# Patient Record
Sex: FEMALE | Race: WHITE | NOT HISPANIC OR LATINO | URBAN - METROPOLITAN AREA
[De-identification: names, ages, dates, MRNs, and addresses within clinical notes are randomized per-mention and may not be internally consistent; named-entity substitution may affect disease eponyms.]

---

## 2017-03-24 ENCOUNTER — EMERGENCY (EMERGENCY)
Facility: HOSPITAL | Age: 82
LOS: 1 days | End: 2017-03-24
Attending: EMERGENCY MEDICINE | Admitting: EMERGENCY MEDICINE
Payer: MEDICARE

## 2017-03-24 VITALS
TEMPERATURE: 98 F | HEIGHT: 62 IN | HEART RATE: 70 BPM | RESPIRATION RATE: 16 BRPM | WEIGHT: 164.91 LBS | DIASTOLIC BLOOD PRESSURE: 75 MMHG | SYSTOLIC BLOOD PRESSURE: 119 MMHG | OXYGEN SATURATION: 98 %

## 2017-03-24 VITALS
DIASTOLIC BLOOD PRESSURE: 83 MMHG | OXYGEN SATURATION: 98 % | HEART RATE: 62 BPM | TEMPERATURE: 98 F | RESPIRATION RATE: 16 BRPM | SYSTOLIC BLOOD PRESSURE: 125 MMHG

## 2017-03-24 DIAGNOSIS — Z90.49 ACQUIRED ABSENCE OF OTHER SPECIFIED PARTS OF DIGESTIVE TRACT: Chronic | ICD-10-CM

## 2017-03-24 PROCEDURE — 73562 X-RAY EXAM OF KNEE 3: CPT

## 2017-03-24 PROCEDURE — 99283 EMERGENCY DEPT VISIT LOW MDM: CPT

## 2017-03-24 PROCEDURE — 73562 X-RAY EXAM OF KNEE 3: CPT | Mod: 26,RT

## 2017-03-24 PROCEDURE — 99283 EMERGENCY DEPT VISIT LOW MDM: CPT | Mod: 25

## 2017-03-24 RX ORDER — TRAMADOL HYDROCHLORIDE 50 MG/1
50 TABLET ORAL ONCE
Qty: 0 | Refills: 0 | Status: DISCONTINUED | OUTPATIENT
Start: 2017-03-24 | End: 2017-03-24

## 2017-03-24 RX ADMIN — TRAMADOL HYDROCHLORIDE 50 MILLIGRAM(S): 50 TABLET ORAL at 13:02

## 2017-03-24 NOTE — ED PROVIDER NOTE - OBJECTIVE STATEMENT
82 y/o F pt with history of obesity and gastric bypass surgery presents to the ED c/o right knee pain since yesterday. Pt states the pain began before she was about to go on the treadmill yesterday, but she went on anyway and when she stepped off she could not put weight on her knee.  Pt states it hurts to straighten her knee, but the pain improves when the knee is bent. Pt can ambulate with a cane, but it is painful. Pt took Tramadol this morning. Pt had knee problems approximately 6 years ago, prior to gastric bypass surgery, when she was overweight but no pain since then until yesterday. Denies neck pain, back pain, numbness/tingling, weakness, dizziness. No other injuries. No further complaints at this time.

## 2017-03-24 NOTE — ED PROVIDER NOTE - NS ED MD SCRIBE ATTENDING SCRIBE SECTIONS
VITAL SIGNS( Pullset)/PAST MEDICAL/SURGICAL/SOCIAL HISTORY/PHYSICAL EXAM/DISPOSITION/HISTORY OF PRESENT ILLNESS/HIV/REVIEW OF SYSTEMS

## 2017-03-24 NOTE — ED ADULT NURSE NOTE - OBJECTIVE STATEMENT
81 yr. old female c/o right knee pain since yesterday.  Pt. went on treadmill thinking it would help pain but after 2 miles on treadmill, pain increased.  Pt. unable to weight bear without pain now.

## 2017-03-24 NOTE — ED PROVIDER NOTE - CARE PLAN
Principal Discharge DX:	Arthralgia of right knee  Instructions for follow-up, activity and diet:	PMD or orthopedic doctor

## 2019-09-04 ENCOUNTER — TRANSCRIPTION ENCOUNTER (OUTPATIENT)
Age: 84
End: 2019-09-04

## 2020-06-22 ENCOUNTER — TRANSCRIPTION ENCOUNTER (OUTPATIENT)
Age: 85
End: 2020-06-22

## 2020-07-09 ENCOUNTER — APPOINTMENT (OUTPATIENT)
Dept: MRI IMAGING | Facility: CLINIC | Age: 85
End: 2020-07-09
Payer: MEDICARE

## 2020-07-09 ENCOUNTER — OUTPATIENT (OUTPATIENT)
Dept: OUTPATIENT SERVICES | Facility: HOSPITAL | Age: 85
LOS: 1 days | End: 2020-07-09
Payer: MEDICARE

## 2020-07-09 DIAGNOSIS — Z90.49 ACQUIRED ABSENCE OF OTHER SPECIFIED PARTS OF DIGESTIVE TRACT: Chronic | ICD-10-CM

## 2020-07-09 DIAGNOSIS — Z00.8 ENCOUNTER FOR OTHER GENERAL EXAMINATION: ICD-10-CM

## 2020-07-09 PROCEDURE — A9585: CPT

## 2020-07-09 PROCEDURE — 70553 MRI BRAIN STEM W/O & W/DYE: CPT

## 2020-07-09 PROCEDURE — 70553 MRI BRAIN STEM W/O & W/DYE: CPT | Mod: 26

## 2020-07-13 PROBLEM — E03.9 HYPOTHYROIDISM, UNSPECIFIED: Chronic | Status: ACTIVE | Noted: 2017-03-24

## 2020-07-25 ENCOUNTER — EMERGENCY (EMERGENCY)
Facility: HOSPITAL | Age: 85
LOS: 1 days | Discharge: HOME | End: 2020-07-25
Attending: EMERGENCY MEDICINE | Admitting: EMERGENCY MEDICINE
Payer: MEDICARE

## 2020-07-25 VITALS
SYSTOLIC BLOOD PRESSURE: 112 MMHG | WEIGHT: 164.91 LBS | HEART RATE: 60 BPM | HEIGHT: 62 IN | OXYGEN SATURATION: 98 % | DIASTOLIC BLOOD PRESSURE: 67 MMHG | TEMPERATURE: 98 F | RESPIRATION RATE: 18 BRPM

## 2020-07-25 DIAGNOSIS — Z90.49 ACQUIRED ABSENCE OF OTHER SPECIFIED PARTS OF DIGESTIVE TRACT: Chronic | ICD-10-CM

## 2020-07-25 PROCEDURE — 73562 X-RAY EXAM OF KNEE 3: CPT | Mod: 26,RT

## 2020-07-25 PROCEDURE — 99283 EMERGENCY DEPT VISIT LOW MDM: CPT

## 2020-07-25 PROCEDURE — 99283 EMERGENCY DEPT VISIT LOW MDM: CPT | Mod: 25

## 2020-07-25 PROCEDURE — 73562 X-RAY EXAM OF KNEE 3: CPT

## 2020-07-25 RX ORDER — MISOPROSTOL 200 UG/1
0 TABLET ORAL
Qty: 0 | Refills: 0 | DISCHARGE

## 2020-07-25 RX ORDER — DOCUSATE SODIUM 100 MG
0 CAPSULE ORAL
Qty: 0 | Refills: 0 | DISCHARGE

## 2020-07-25 RX ORDER — LEVOTHYROXINE SODIUM 125 MCG
1 TABLET ORAL
Qty: 0 | Refills: 0 | DISCHARGE

## 2020-07-25 RX ORDER — DICLOFENAC SODIUM 30 MG/G
0 GEL TOPICAL
Qty: 0 | Refills: 0 | DISCHARGE

## 2020-07-25 RX ORDER — TRAMADOL HYDROCHLORIDE 50 MG/1
2 TABLET ORAL
Qty: 0 | Refills: 0 | DISCHARGE

## 2020-07-25 RX ORDER — ASPIRIN/CALCIUM CARB/MAGNESIUM 324 MG
1 TABLET ORAL
Qty: 0 | Refills: 0 | DISCHARGE

## 2020-07-25 RX ORDER — TRIAMTERENE 100 MG/1
1 CAPSULE ORAL
Qty: 0 | Refills: 0 | DISCHARGE

## 2020-07-25 RX ORDER — MULTIVIT-MIN/FERROUS GLUCONATE 9 MG/15 ML
1 LIQUID (ML) ORAL
Qty: 0 | Refills: 0 | DISCHARGE

## 2020-07-25 RX ADMIN — Medication 500 MILLIGRAM(S): at 10:30

## 2020-07-25 NOTE — ED PROVIDER NOTE - PHYSICAL EXAMINATION
no midline C/T/L TTP  FROM of UE bilaterally with no pain on ROM NVI  FROM of LLE with no pain on ROM NVI  No TTP to right hip, femur, tib/fib, ankle or foot.  Sensation grossly intact cap refill less then 2 seconds +pedal pulse.  Pt with no TTP to patellar on exam FROM of knee but reports pain on flexion of knee with clicking sound noted.  No significant ligament instability noted to bedside exam   7 cm skin tear noted to left forearm no regino TTP, no active bleeding at this time, no cellulitis

## 2020-07-25 NOTE — ED PROVIDER NOTE - CLINICAL SUMMARY MEDICAL DECISION MAKING FREE TEXT BOX
Pt is a 86 yo female who presents to the ED with a cc of right knee pain s/p fall.  PMHx of Hypothyroidism, unspecified type, OA.  Pt reports that yesterday she was walking down her wooden steps in socks when she lost her balance, and fell down the last 3 steps landing on her right knee.  Pt reports that she did not strike her head and denies LOC.  Pt is not on blood thinners.  Was able to stand with help and has been ambulating with the aide of a cane which she does not normally use.  Reports that she also sustained a skin tear to her left arm which she wrapped. Pt states that the pain has been progressively worsening in  right knee.  She took Tramadol this morning with little to no relief.  (pt takes Tramadol chronically for OA).  Denies previous injury to right knee.  Denies HA, visual changes, N/V, CP, SOB, abd pain.  Denies neck or back pain.  Denies ext numbness or weakness, denies loss of bowel or bladder function. Pt with mechanical fall now with right knee pain has been ambulating.  Concern for MSK injury.  Will medicate for pain and obtain x-ray.  Will dress left forearm skin tear

## 2020-07-25 NOTE — ED PROVIDER NOTE - PROGRESS NOTE DETAILS
pt with improvement in symptoms, is able to bear weight and ambulate, NVI.  Results of x-ray reviewed.  Advised that this does not exclude underlying meniscus, ligament or tendon injury and therefore it is important for pt to follow up with orthopedics for possible MRI, PT.  All questions answered.  Offered knee immobilizer prefers ACE

## 2020-07-25 NOTE — ED PROVIDER NOTE - OBJECTIVE STATEMENT
Pt is a 84 yo female who presents to the ED with a cc of right knee pain s/p fall.  PMHx of Hypothyroidism, unspecified type, OA.  Pt reports that yesterday she was walking down her wooden steps in socks when she lost her balance, and fell down the last 3 steps landing on her right knee.  Pt reports that she did not strike her head and denies LOC.  Pt is not on blood thinners.  Was able to stand with help and has been ambulating with the aide of a cane which she does not normally use.  Reports that she also sustained a skin tear to her left arm which she wrapped. Pt states that the pain has been progressively worsening in  right knee.  She took Tramadol this morning with little to no relief.  (pt takes Tramadol chronically for OA).  Denies previous injury to right knee.  Denies HA, visual changes, N/V, CP, SOB, abd pain.  Denies neck or back pain.  Denies ext numbness or weakness, denies loss of bowel or bladder function.

## 2020-07-25 NOTE — ED PROVIDER NOTE - CARE PROVIDER_API CALL
Dave Rocha  ORTHOPAEDIC SURGERY  [address] [place] [address]  Phone: [phone]  Fax: [phone]  Follow Up Time:

## 2020-07-25 NOTE — ED PROVIDER NOTE - PATIENT PORTAL LINK FT
You can access the FollowMyHealth Patient Portal offered by [facility] by registering at the following website: [email or web address]. By joining [facility]’s FollowMyHealth portal, you will also be able to view your health information using other applications (apps) compatible with our system.

## 2020-07-25 NOTE — ED ADULT NURSE REASSESSMENT NOTE - NS ED NURSE REASSESS COMMENT FT1
Pt's right knee wrapped with ace bandage, ice packs provided and teaching done for use at home along with prescribed meds.

## 2020-07-25 NOTE — ED PROVIDER NOTE - CARE PLAN
Principal Discharge DX:	Knee pain, right  Secondary Diagnosis:	Skin tear of forearm without complication  Secondary Diagnosis:	Fall

## 2020-07-25 NOTE — ED PROVIDER NOTE - NSFOLLOWUPINSTRUCTIONS_ED_ALL_ED_FT
Return to the ED for any new or worsening symptoms  Take your medication as prescribed  ACE wrap to knee as needed for pain and comfort   Ice to affected knee on 20 min off 40 min as needed for pain and swelling   Continue to use your cane to ambulate  Naproxen per label instructions as needed for pain   Follow up with orthopedics call today to schedule follow up  Bacitracin to affected skin tear 2 times a day   Advance activity as tolerated

## 2020-07-25 NOTE — ED ADULT TRIAGE NOTE - CHIEF COMPLAINT QUOTE
"I fell down 3-4 wooden steps at home yesterday and hurt my right knee and left forearm." Patient is not on blood thinners and denies any head injury. Skin tear left forearm and pain right knee.

## 2020-07-25 NOTE — ED ADULT NURSE NOTE - OBJECTIVE STATEMENT
84 y/o female received aox3 via stretcher c/o right knee pain. pt is s/p mechanical fall yesterday at home, fell about 3-4 steps, denies head trauma/loc. no obvious signs of injury noted but pt reports R knee pain during flexion of joint. no hip pain noted. skin tear noted to right forearm, 2 inches in length. bacitracin, telfa + cling dressing applied.

## 2021-01-16 ENCOUNTER — TRANSCRIPTION ENCOUNTER (OUTPATIENT)
Age: 86
End: 2021-01-16

## 2021-02-13 ENCOUNTER — TRANSCRIPTION ENCOUNTER (OUTPATIENT)
Age: 86
End: 2021-02-13

## 2021-04-01 ENCOUNTER — TRANSCRIPTION ENCOUNTER (OUTPATIENT)
Age: 86
End: 2021-04-01

## 2022-06-20 PROBLEM — M19.90 UNSPECIFIED OSTEOARTHRITIS, UNSPECIFIED SITE: Chronic | Status: ACTIVE | Noted: 2020-07-25

## 2022-06-23 ENCOUNTER — APPOINTMENT (OUTPATIENT)
Dept: RADIOLOGY | Facility: CLINIC | Age: 87
End: 2022-06-23
Payer: MEDICARE

## 2022-06-23 ENCOUNTER — OUTPATIENT (OUTPATIENT)
Dept: OUTPATIENT SERVICES | Facility: HOSPITAL | Age: 87
LOS: 1 days | End: 2022-06-23
Payer: MEDICARE

## 2022-06-23 DIAGNOSIS — Z90.49 ACQUIRED ABSENCE OF OTHER SPECIFIED PARTS OF DIGESTIVE TRACT: Chronic | ICD-10-CM

## 2022-06-23 DIAGNOSIS — Z00.8 ENCOUNTER FOR OTHER GENERAL EXAMINATION: ICD-10-CM

## 2022-06-23 PROCEDURE — 71046 X-RAY EXAM CHEST 2 VIEWS: CPT | Mod: 26

## 2022-06-23 PROCEDURE — 71046 X-RAY EXAM CHEST 2 VIEWS: CPT

## 2022-07-12 NOTE — ED ADULT NURSE NOTE - OTHER CARE PROVIDERS
Airway patent, Nasal mucosa clear. Mouth with normal mucosa. Throat has no vesicles, no oropharyngeal exudates and uvula is midline.
jorge

## 2023-06-11 NOTE — ED ADULT NURSE NOTE - LEARNS BEST
Patient refusing to open her mouth for PO medications. RN crushed and dissolved medications in sprite or coke, then yfn up in a syringe. Patient did take all of her medications this way with assistance from family. Axillary temp of 101 this AM and tylenol given. On 1 L NC. To draining dark freda/red urine. Urine sample sent today. Patient had two small soft stools today. Vesicular lesions on her vulva noted.     Problem: Infection  Goal: Absence of Infection Signs and Symptoms  Outcome: Ongoing, Progressing     Problem: Adult Inpatient Plan of Care  Goal: Plan of Care Review  Outcome: Ongoing, Progressing      Hearing

## 2024-03-19 NOTE — ED ADULT TRIAGE NOTE - HEART RATE (BEATS/MIN)
Subjective     REASON FOR CONSULTATION:  anemia  Provide an opinion on any further workup or treatment                             REQUESTING PHYSICIAN:  Douglas    RECORDS OBTAINED:  Records of the patients history including those obtained from the referring provider were reviewed and summarized in detail.    History of Present Illness   This is a 64-year-old woman with type 2 diabetes, prior cerebrovascular accident, chronic bronchitis, obstructive sleep apnea, coronary artery disease, hypertension, hyperlipidemia, ischemic cardiomyopathy on anticoagulation with Xarelto and Plavix and AICD in place (most recent EF 40-45%).  The patient is referred for evaluation of anemia.  The patient has prior history of microcytic, hypochromic anemia in October 2022 requiring transfusion support.  Apparently she had endoscopy during hospitalization which showed some nonbleeding colonic angioectasias (per cardiology notes).  When checked on 1/24/2023 the patient had normal hemoglobin 13.2 with MCV 81.8.  A CBC with her primary care on 1/29/2024 showed substantial drop in the hemoglobin to 7.2 with MCV 72.8/MCHC 27.7 and iron studies showed a ferritin of 42 and iron saturation of 4% with elevated TIBC 592 consistent with severe iron deficiency.    The patient states she has been on oral iron over-the-counter for about 1 year.  She does not see blood in the stool including melena.    Because of poor response to oral iron therapy the patient was given IV iron with Venofer 300 mg x 3 doses completed on 2/22/2024.  She has noted improvement in fatigue and stamina since IV iron replacement.  She does continue on oral iron tablet daily as well.  Her hemoglobin has normalized 13.7 today.    Past Medical History:   Diagnosis Date    Abdominal pain     Abnormal liver function test     Acute bronchitis     Anemia     Benign essential hypertension     CAD (coronary artery disease)     CHF (congestive heart failure)     Colon polyp      Congestive heart disease     COPD (chronic obstructive pulmonary disease)     Cough     Diabetes     Diabetes mellitus     Diarrhea     Gastroenteritis     Health care maintenance     Hyperlipidemia     Hypertension     IFG (impaired fasting glucose)     Ischemic cardiomyopathy     Myocardial infarction     SHELLIE (obstructive sleep apnea)     Sore throat     Stroke 2020    Type 2 diabetes mellitus     Vaginal yeast infection     Vitamin D deficiency         Past Surgical History:   Procedure Laterality Date    CARDIAC CATHETERIZATION      CARDIAC CATHETERIZATION N/A 01/06/2020    Procedure: Coronary angiography;  Surgeon: Oneida Saldivar MD;  Location:  [facility];  Service: Cardiovascular    CARDIAC CATHETERIZATION N/A 01/06/2020    Procedure: Left heart cath;  Surgeon: Oneida Saldivar MD;  Location:  [facility];  Service: Cardiovascular    CARDIAC CATHETERIZATION N/A 01/06/2020    Procedure: Left ventriculography;  Surgeon: Oneida Saldivar MD;  Location:  [facility];  Service: Cardiovascular    CARDIAC CATHETERIZATION N/A 01/06/2020    Procedure: Percutaneous Coronary Intervention;  Surgeon: Oneida Saldivar MD;  Location:  [facility];  Service: Cardiovascular    CARDIAC CATHETERIZATION N/A 01/06/2020    Procedure: Stent HUGO coronary;  Surgeon: Oneida Saldivar MD;  Location:  [facility];  Service: Cardiovascular    CARDIAC CATHETERIZATION  01/06/2020    Procedure: Functional Flow [place];  Surgeon: Oneida Saldivar MD;  Location:  [facility];  Service: Cardiovascular    CARDIAC CATHETERIZATION N/A 10/26/2020    Procedure: Coronary angiography;  Surgeon: Oneida Saldivar MD;  Location:  [facility];  Service: Cardiovascular;  Laterality: N/A;    CARDIAC CATHETERIZATION N/A 10/26/2020    Procedure: Left heart cath;  Surgeon: Oneida Saldivar MD;  Location:  [facility];   Service: Cardiovascular;  Laterality: N/A;    CARDIAC CATHETERIZATION N/A 10/26/2020    Procedure: Left ventriculography;  Surgeon: Oneida Saldivar MD;  [facility];  Service: Cardiovascular;  Laterality: N/A;    CARDIAC CATHETERIZATION  10/26/2020    Procedure: Functional Flow [place];  Surgeon: Oneida Saldivar MD;  [facility];  Service: Cardiovascular;;    CARDIAC CATHETERIZATION N/A 07/19/2021    Procedure: Coronary angiography;  Surgeon: Oneida Saldivar MD;  [facility];  Service: Cardiovascular;  Laterality: N/A;    CARDIAC CATHETERIZATION N/A 07/19/2021    Procedure: Left heart cath;  Surgeon: Oneida Saldivar MD;  [facility];  Service: Cardiovascular;  Laterality: N/A;    CARDIAC CATHETERIZATION N/A 07/19/2021    Procedure: Left ventriculography;  Surgeon: Oneida Saldivar MD;  Location: [facility] CATH INVASIVE LOCATION;  Service: Cardiovascular;  Laterality: N/A;    CARDIAC CATHETERIZATION N/A 07/19/2021    Procedure: Percutaneous Coronary Intervention;  Surgeon: Oneida Saldivar MD;  Location: [facility] CATH INVASIVE LOCATION;  Service: Cardiovascular;  Laterality: N/A;    CARDIAC CATHETERIZATION N/A 07/19/2021    Procedure: Optical Coherent Tomography;  Surgeon: Oneida Saldivar MD;  Location: [facility] CATH INVASIVE LOCATION;  Service: Cardiovascular;  Laterality: N/A;    CARDIAC CATHETERIZATION N/A 07/19/2021    Procedure: Stent HUGO coronary;  Surgeon: Oneida Saldivar MD;  Location: [facility] CATH INVASIVE LOCATION;  Service: Cardiovascular;  Laterality: N/A;    CARDIAC CATHETERIZATION  07/19/2021    Procedure: RESTING FULL CYCLE RATIO;  Surgeon: Oneida Saldivar MD;  Location: [facility] CATH INVASIVE LOCATION;  Service: Cardiovascular;;  RFR    CARDIAC DEFIBRILLATOR PLACEMENT  2010    Medtronic dual chamber/Dr. Valentin    CARDIAC ELECTROPHYSIOLOGY PROCEDURE N/A 01/12/2018    Procedure: ICD DC generator  change  medtronic;  Surgeon: Hany Ornelas MD;  Location: [facility] INVASIVE LOCATION;  Service:     PACEMAKER IMPLANTATION      TUBAL ABDOMINAL LIGATION          Current Outpatient Medications on File Prior to Visit   Medication Sig Dispense Refill    Accu-Chek FastClix Lancets misc Use to test blood sugar 4 times daily  E11.8 400 each 1    Accu-Chek Guide test strip USE 4 TIMES A DAY E11.8 300 each 2    albuterol sulfate  (90 Base) MCG/ACT inhaler Inhale 2 puffs Every 4 (Four) Hours As Needed for Wheezing for up to 180 days. 8 g 3    atorvastatin (LIPITOR) 80 MG tablet Take 1 tablet by mouth Daily. 90 tablet 3    Blood Glucose Monitoring Suppl (Accu-Chek Guide) w/Device kit Inject 1 Device under the skin into the appropriate area as directed Daily. 1 kit 0    carvedilol (COREG) 25 MG tablet TAKE 1 TABLET BY MOUTH TWICE A DAY WITH MEALS 180 tablet 2    Cholecalciferol (VITAMIN D3) 2000 UNITS capsule Take 1,000 Units by mouth Daily.      clopidogrel (PLAVIX) 75 MG tablet Take 1 tablet by mouth Daily. 90 tablet 1    ferrous sulfate 325 (65 FE) MG tablet One PO daily with food for anemia 30 tablet 5    furosemide (LASIX) 40 MG tablet Take 1 tablet by mouth Daily. 90 tablet 1    HYDROcodone-acetaminophen (NORCO) 5-325 MG per tablet Take 1 tablet by mouth Every 6 (Six) Hours As Needed for Moderate Pain. 10 tablet 0    Insulin Pen Needle 32G X 4 MM misc USE 4 TIMES A  each 3    Multiple Vitamins-Minerals (MULTIVITAL PO) Take 1 tablet by mouth Daily.      nitroglycerin (Nitrostat) 0.4 MG SL tablet Place 1 tablet under the tongue Every 5 (Five) Minutes As Needed for Chest Pain. Take no more than 3 doses in 15 minutes. 30 tablet 11    Omega-3 Fatty Acids (FISH OIL) 1200 MG capsule capsule Take 1 capsule by mouth Daily With Breakfast.      potassium chloride 10 MEQ CR tablet Take 1 tablet by mouth Daily. Resume on 1/7/20 90 tablet 1    potassium chloride 10 MEQ CR tablet TAKE 1 TABLET BY MOUTH DAILY 90  tablet 1    rivaroxaban (Xarelto) 20 MG tablet Take 1 tablet by mouth Daily With Dinner. 90 tablet 1    Semaglutide, 1 MG/DOSE, (Ozempic, 1 MG/DOSE,) 4 MG/3ML solution pen-injector Inject 1 mg under the skin into the appropriate area as directed 1 (One) Time Per Week. 9 mL 1    spironolactone (ALDACTONE) 25 MG tablet Take 1 tablet by mouth Daily. 90 tablet 3    traZODone (DESYREL) 50 MG tablet Take 1 tablet by mouth every night at bedtime. 90 tablet 2    vitamin B-12 (CYANOCOBALAMIN) 500 MCG tablet Take 1 tablet by mouth Daily.       No current facility-administered medications on file prior to visit.        ALLERGIES:  No Known Allergies     Social History     Socioeconomic History    Marital status:     Number of children: 2   Tobacco Use    Smoking status: Former     Current packs/day: 0.00     Average packs/day: 1 pack/day for 30.0 years (30.0 ttl pk-yrs)     Types: Cigarettes     Start date: 1979     Quit date: 2009     Years since quittin.4    Smokeless tobacco: Never    Tobacco comments:     QUIT 10 YRS   Vaping Use    Vaping status: Never Used   Substance and Sexual Activity    Alcohol use: Yes     Comment: rarely uses ETOH/caffeine use    Drug use: No    Sexual activity: Yes     Partners: Male        Family History   Problem Relation Age of Onset    Heart attack Mother     Diabetes Mother     Heart disease Mother     Hyperlipidemia Mother     Emphysema Mother     Diabetes Father     Heart disease Father     Hyperlipidemia Father     Hypertension Father     Colon cancer Father     Heart attack Father     Hypertension Sister     Hypertension Brother     Heart disease Brother     Hyperlipidemia Brother     Heart disease Brother     Heart attack Brother     Heart disease Maternal Grandmother     Heart disease Maternal Grandfather     Heart disease Paternal Grandmother     Heart disease Paternal Grandfather         Review of Systems   Constitutional:  Positive for fatigue. Negative for  "unexpected weight change.   HENT: Negative.     Respiratory:  Negative for chest tightness and shortness of breath.    Cardiovascular:  Negative for chest pain and palpitations.   Gastrointestinal: Negative.    Musculoskeletal: Negative.    Allergic/Immunologic: Negative.    Neurological:  Negative for syncope, weakness and light-headedness.   Hematological: Negative.    Psychiatric/Behavioral: Negative.            Objective     Vitals:    03/25/24 1011   BP: 131/74   Pulse: 86   Resp: 16   Temp: 97.8 °F (36.6 °C)   TempSrc: Temporal   SpO2: 98%   Weight: 81 kg (178 lb 8 oz)   Height: 152.4 cm (60\")   PainSc: 0-No pain           3/25/2024    10:06 AM   Current Status   ECOG score 0       Physical Exam    CONSTITUTIONAL: pleasant well-developed adult woman  HEENT: no icterus, no thrush, moist membranes  LYMPH: no cervical or supraclavicular lad  CV: RRR, S1S2, no murmur  RESP: cta bilat, no wheezing, no rales  GI: soft, non-tender, no splenomegaly, +bs  MUSC: no edema, normal gait  NEURO: alert and oriented x3, normal strength  PSYCH: normal mood and affect  Exam is unchanged-3/25/2024    RECENT LABS:  Hematology WBC   Date Value Ref Range Status   03/25/2024 7.54 3.40 - 10.80 10*3/mm3 Final   01/29/2024 9.81 3.40 - 10.80 10*3/mm3 Final   01/24/2023 8.27 4.5 - 11.0 10*3/uL Final     RBC   Date Value Ref Range Status   03/25/2024 5.12 3.77 - 5.28 10*6/mm3 Final   01/29/2024 3.57 (L) 3.77 - 5.28 10*6/mm3 Final   01/24/2023 5.23 (H) 4.0 - 5.2 10*6/uL Final     Hemoglobin   Date Value Ref Range Status   03/25/2024 13.7 12.0 - 15.9 g/dL Final   01/24/2023 13.2 12.0 - 16.0 g/dL Final     Hematocrit   Date Value Ref Range Status   03/25/2024 43.9 34.0 - 46.6 % Final   01/24/2023 42.8 36.0 - 46.0 % Final     Platelets   Date Value Ref Range Status   03/25/2024 339 140 - 450 10*3/mm3 Final   01/24/2023 378 140 - 440 10*3/uL Final            Assessment & Plan     *Severe iron deficiency anemia  The patient has prior history of " iron deficiency anemia October 2022 requiring PRBC and IV iron during hospitalization  EGD/colonoscopy October 2022 showed nonbleeding colonic angioectasias  hemoglobin 7.2 with MCV 72.8 and iron sat 4% consistent with severe iron deficiency despite taking daily iron tablet OTC  Status post Venofer 300 mg x 3 doses completed 2/22/2024  Hemoglobin normal today 13.7    *Case complicated by need of chronic anticoagulation with Xarelto and antiplatelet therapy with Plavix for ischemic cardiomyopathy/CAD/prior stroke    Hematology plan/recommendations:  Recheck ferritin iron profile today to see if she has adequate iron stores-we will call her with results and recommendations regarding her oral iron and follow-up.  Patient needs repeat GI assessment as most likely cause of recurrent iron deficiency in the setting of her anticoagulation is GI blood loss.  A referral to GI was made-she is awaiting change in insurance to make the appointment          70

## 2024-11-13 NOTE — ED PROVIDER NOTE - QUALITY
Discharge order received from MD.    Discharge instructions and medications reviewed with patient. ID band matched with baby band. Follow up instructions with OB given. Mother verbalizes understanding of instructions. Discharged in stable condition.  
OB Progress Note PPD#1    S: Feels well. Ambulating, eating. Pain controlled. Moderate VB. Breastfeeding.  Voiding without difficulty, + flatus    O:  Blood pressure 112/63, pulse 74, temperature 97.8 °F (36.6 °C), temperature source Oral, resp. rate 16, height 5' 2\" (1.575 m), weight 135 lb (61.2 kg), last menstrual period 2024, SpO2 97%, currently breastfeeding.  Gen: NAD, AAOx3  Breasts: soft, nontender, nonerythematous  Abdomen: soft, nontender, nondistended, fundus firm and nontender  Gyne: moderate lochia  Ext: trace edema      Lab Results  Lab Results   Component Value Date    WBC 7.5 2024    HGB 11.5 2024    HCT 33.6 2024    .0 2024     Recent Labs   Lab 24  0815 24  0708   RBC 3.94 3.52*   HGB 12.9 11.5*   HCT 37.0 33.6*   MCV 93.9 95.5   MCH 32.7 32.7   MCHC 34.9 34.2   RDW 11.8 11.9   NEPRELIM 4.26 6.02   WBC 5.8 7.5   .0* 110.0*           A/P: PPD#1 s/p , doing well  1. Continue pain control with motrin PRN  2. Breastfeeding   -- given encouragement and support   -- lactation consult PRN  3. Hgb stable at 11.5  4. DVT ppx: ambulating    Discharge home today, instructions reviewed    Emma Erazo D.O.  [facility]  Contact via [employer] or cell     
Patient declines pitocin at this time.  
Patient up to bathroom with assist x 2.  Unable to void at this time. Patient transferred to mother/baby room 1117 per wheelchair in stable condition with baby and personal belongings.  Accompanied by significant other and staff.  Report given to mother/baby KARRIE Domínguez.  
Pt admit to room 1117 from L/D. Report received. Mother and baby together in room. Teaching Materials given and discussed plan of care.  
Pt is a 37 year old female admitted to 106/-A.     Chief Complaint   Patient presents with    Scheduled Induction      Pt is  40w0d intra-uterine pregnancy.  History obtained, consents signed. Oriented to room, staff, and plan of care.  
see above

## 2024-12-20 NOTE — ED ADULT NURSE NOTE - PRO INTERPRETER NEED 2
